# Patient Record
Sex: FEMALE | Race: WHITE | NOT HISPANIC OR LATINO | URBAN - METROPOLITAN AREA
[De-identification: names, ages, dates, MRNs, and addresses within clinical notes are randomized per-mention and may not be internally consistent; named-entity substitution may affect disease eponyms.]

---

## 2018-07-10 ENCOUNTER — OFFICE VISIT (OUTPATIENT)
Dept: URGENT CARE | Facility: CLINIC | Age: 28
End: 2018-07-10
Payer: COMMERCIAL

## 2018-07-10 VITALS
TEMPERATURE: 99.1 F | BODY MASS INDEX: 21.67 KG/M2 | SYSTOLIC BLOOD PRESSURE: 110 MMHG | DIASTOLIC BLOOD PRESSURE: 70 MMHG | HEART RATE: 68 BPM | HEIGHT: 68 IN | WEIGHT: 143 LBS | RESPIRATION RATE: 12 BRPM

## 2018-07-10 DIAGNOSIS — R19.7 DIARRHEA, UNSPECIFIED TYPE: Primary | ICD-10-CM

## 2018-07-10 PROCEDURE — 99283 EMERGENCY DEPT VISIT LOW MDM: CPT | Performed by: PHYSICIAN ASSISTANT

## 2018-07-10 PROCEDURE — G0382 LEV 3 HOSP TYPE B ED VISIT: HCPCS | Performed by: PHYSICIAN ASSISTANT

## 2018-07-10 NOTE — PROGRESS NOTES
NAME: Calista Rankin is a 32 y o  female  : 1990    MRN: [id number]      Assessment and Plan   Diarrhea, unspecified type [R19 7]  1  Diarrhea, unspecified type             Patient Instructions   Patient Instructions   F/u with PCP as needed      Proceed to ER if symptoms worsen  History of Present Illness     Patient presents complaining of recent hx of diarrhea  She states that she started on Sat night with abdominal cramping and several episodes of diarrhea  She states she had accompanied chills but no fever  She denies blood in stool  She states her sx resolved a day later but she is here because she needs a note that she is well enough to go to work  Patient denies any current complaints including abdominal or back pain, fever, chills, blood in stool, N/V or diarrhea  Review of Systems   Review of Systems   Constitutional: Negative for chills and fever  Gastrointestinal: Negative for abdominal distention, abdominal pain, blood in stool, diarrhea, nausea and vomiting  Current Medications     No current outpatient prescriptions on file  Current Allergies     Allergies as of 07/10/2018 - Reviewed 07/10/2018   Allergen Reaction Noted    Sudafed [pseudoephedrine] Palpitations 07/10/2018              Past Medical History:   Diagnosis Date    Tetralogy of Fallot        Past Surgical History:   Procedure Laterality Date    CARDIAC SURGERY         No family history on file  Medications have been verified  The following portions of the patient's history were reviewed and updated as appropriate: allergies, current medications, past family history, past medical history, past social history, past surgical history and problem list     Objective   /70   Pulse 68   Temp 99 1 °F (37 3 °C)   Resp 12   Ht 5' 8" (1 727 m)   Wt 64 9 kg (143 lb)   BMI 21 74 kg/m²      Physical Exam     Physical Exam   Constitutional: She appears well-developed and well-nourished   No distress  Abdominal: Soft  Bowel sounds are normal  She exhibits no distension  There is no tenderness  There is no rebound and no guarding

## 2018-07-10 NOTE — LETTER
July 10, 2018     Patient: Lamont Grove   YOB: 1990   Date of Visit: 7/10/2018       To Whom it May Concern:    Lamont Grove was seen in my clinic on 7/10/2018  She may return to work on 7/10/2018  If you have any questions or concerns, please don't hesitate to call           Sincerely,          Justin Barajas PA-C        CC: No Recipients

## 2025-01-28 ENCOUNTER — OFFICE VISIT (OUTPATIENT)
Dept: OBGYN CLINIC | Facility: CLINIC | Age: 35
End: 2025-01-28
Payer: COMMERCIAL

## 2025-01-28 VITALS
BODY MASS INDEX: 24.74 KG/M2 | DIASTOLIC BLOOD PRESSURE: 68 MMHG | SYSTOLIC BLOOD PRESSURE: 106 MMHG | WEIGHT: 157.6 LBS | HEIGHT: 67 IN

## 2025-01-28 DIAGNOSIS — Z01.419 ENCOUNTER FOR GYNECOLOGICAL EXAMINATION WITHOUT ABNORMAL FINDING: ICD-10-CM

## 2025-01-28 DIAGNOSIS — N92.0 MENORRHAGIA WITH REGULAR CYCLE: Primary | ICD-10-CM

## 2025-01-28 DIAGNOSIS — N89.8 VAGINAL DISCHARGE: ICD-10-CM

## 2025-01-28 PROCEDURE — 99385 PREV VISIT NEW AGE 18-39: CPT | Performed by: NURSE PRACTITIONER

## 2025-01-28 NOTE — PROGRESS NOTES
[facility] [place]  [address] [place] [address]    ASSESSMENT/PLAN: Kinga Moeller is a 34 y.o.  who presents for annual gynecologic exam.    Encounter for routine gynecologic examination  - Routine well gynecologic exam completed today.  - Cervical Cancer Screening: Current ASCCP Guidelines reviewed. Last Pap: Not on file . History of abnormal: No  - HPV Vaccination status: Not immunized  - STI screening offered including HIV testing: offered, pt declined  - Contraceptive counseling discussed.  Current contraception: vasectomy:   - The following were reviewed in today's visit: breast self exam, exercise, healthy diet, and vaginitis    Additional problems addressed during this visit:  Assessment & Plan  Menorrhagia with regular cycle  Discussed treatment with progesterone IUD, uterine ablation. Interested in uterine ablation.   Orders:    US pelvis complete non OB; Future    TSH + Free T4; Future    CBC and differential; Future    Encounter for gynecological examination without abnormal finding    Orders:    IGP, Aptima HPV, Rfx 16/18,45    Vaginal discharge  History of frequent BV, self manages with probiotic and supplements.     Orders:    VAGINOSIS DNA PROBE    CC:  Annual Gynecologic Examination    HPI: Kinga Moeller is a 34 y.o.  who presents for annual gynecologic examination. Pt concerned with heavy, painful menses since last baby 3 years ago. Reports 21 day cycles, with heavy bleeding for the first 2 days, lasting a total of 4 days. Interested in treatment. Also c/o frequent BV, which she manages with probiotic and vitamin c/rosehips supplement. Feels like symptoms starting today.     ROS: Negative except as noted in HPI    Patient's last menstrual period was 2025 (exact date).       She  reports being sexually active and has had partner(s) who are male. She reports using the following method of birth control/protection: Male Sterilization.       The following portions of the  "patient's history were reviewed and updated as appropriate:   Past Medical History:   Diagnosis Date    Congenital heart defect     Tetralogy of Fallot      Past Surgical History:   Procedure Laterality Date    CARDIAC SURGERY  1989     Family History   Problem Relation Age of Onset    Breast cancer Mother     Lung cancer Mother     Cancer Maternal Grandmother     Breast cancer Paternal Grandmother      Social History     Socioeconomic History    Marital status: /Civil Union     Spouse name: None    Number of children: None    Years of education: None    Highest education level: None   Occupational History    None   Tobacco Use    Smoking status: Never    Smokeless tobacco: Never   Vaping Use    Vaping status: Never Used   Substance and Sexual Activity    Alcohol use: Yes     Comment: socially    Drug use: Never    Sexual activity: Yes     Partners: Male     Birth control/protection: Male Sterilization   Other Topics Concern    None   Social History Narrative    None     Social Drivers of Health     Financial Resource Strain: Not on file   Food Insecurity: Not on file   Transportation Needs: Not on file   Physical Activity: Not on file   Stress: Not on file   Social Connections: Not on file   Intimate Partner Violence: Not on file   Housing Stability: Not on file     Outpatient Medications Marked as Taking for the 1/28/25 encounter (Office Visit) with DELMY Borrego   Medication    Ferrous Sulfate (IRON PO)     Allergies   Allergen Reactions    Sudafed [Pseudoephedrine] Palpitations           Objective:  /68 (BP Location: Left arm, Patient Position: Sitting, Cuff Size: Standard)   Ht 5' 7\" (1.702 m)   Wt 71.5 kg (157 lb 9.6 oz)   LMP 01/16/2025 (Exact Date)   Breastfeeding No   BMI 24.68 kg/m²        Chaperone present? Declines.    General Appearance: alert and oriented, in no acute distress.   Neck/Thyroid: No thyromegaly, no thyroid nodules.  Respiratory: Unlabored " breathing.  Cardiovascular: No peripheral edema.  Abdomen: Soft, non-tender, non-distended, no masses, no rebound or guarding.  Breast Exam: No dimpling, nipple retraction or discharge. No lumps or masses. No axillary or supraclavicular nodes.   Pelvic:       External genitalia: Normal appearance, no abnormal pigmentation, no lesions or masses. Normal Bartholin's and [place]'s.      Urinary system: Urethral meatus normal, bladder non-tender.      Vaginal: normal mucosa without prolapse or lesions. Normal-appearing physiologic discharge.      Cervix: Normal-appearing, well-epithelialized, no gross lesions or masses. No cervical motion tenderness.      Adnexa: No adnexal masses or tenderness noted.      Uterus: Normal-sized, regular contour, midline, mobile, no uterine tenderness.  Extremities: Normal range of motion. Warm, well-perfused, non-tender.   Skin: normal, no rash or abnormalities  Neurologic: alert, oriented x3  Psychiatric: Appropriate affect, mood stable, cooperative with exam.        DELMY Borrego  1/28/2025 10:58 AM

## 2025-01-28 NOTE — PATIENT INSTRUCTIONS
Please schedule your pelvic ultrasound and go to the lab for your blood work  I will reach out when results are available  Call with any concerns

## 2025-01-30 ENCOUNTER — RESULTS FOLLOW-UP (OUTPATIENT)
Dept: OBGYN CLINIC | Facility: CLINIC | Age: 35
End: 2025-01-30

## 2025-01-30 DIAGNOSIS — N76.0 BV (BACTERIAL VAGINOSIS): Primary | ICD-10-CM

## 2025-01-30 DIAGNOSIS — B96.89 BV (BACTERIAL VAGINOSIS): Primary | ICD-10-CM

## 2025-01-30 LAB
A VAGINAE DNA VAG QL NAA+PROBE: ABNORMAL SCORE
BVAB2 DNA VAG QL NAA+PROBE: ABNORMAL SCORE
C ALBICANS DNA VAG QL NAA+PROBE: NEGATIVE
C GLABRATA DNA VAG QL NAA+PROBE: NEGATIVE
C TRACH RRNA SPEC QL NAA+PROBE: NEGATIVE
MEGA1 DNA VAG QL NAA+PROBE: ABNORMAL SCORE
N GONORRHOEA RRNA SPEC QL NAA+PROBE: NEGATIVE
T VAGINALIS RRNA SPEC QL NAA+PROBE: NEGATIVE

## 2025-01-30 RX ORDER — METRONIDAZOLE 7.5 MG/G
1 GEL VAGINAL
Qty: 70 G | Refills: 3 | Status: SHIPPED | OUTPATIENT
Start: 2025-01-30

## 2025-01-30 RX ORDER — METRONIDAZOLE 500 MG/1
500 TABLET ORAL EVERY 12 HOURS SCHEDULED
Qty: 14 TABLET | Refills: 0 | Status: SHIPPED | OUTPATIENT
Start: 2025-01-30 | End: 2025-02-06

## 2025-01-30 NOTE — TELEPHONE ENCOUNTER
Reviewed + BV result with pt, discussed treatment followed by suppression with vaginal gel for recurrent BV. Pt would like to try. Rx to pharmacy. Follow up visit prn.

## 2025-02-03 LAB
CYTOLOGIST CVX/VAG CYTO: NORMAL
DX ICD CODE: NORMAL
HPV GENOTYPE REFLEX: NORMAL
HPV I/H RISK 4 DNA CVX QL PROBE+SIG AMP: NEGATIVE
OTHER STN SPEC: NORMAL
PATH REPORT.FINAL DX SPEC: NORMAL
SL AMB NOTE:: NORMAL
SL AMB SPECIMEN ADEQUACY: NORMAL
SL AMB TEST METHODOLOGY: NORMAL

## 2025-02-07 ENCOUNTER — HOSPITAL ENCOUNTER (OUTPATIENT)
Dept: ULTRASOUND IMAGING | Facility: HOSPITAL | Age: 35
End: 2025-02-07
Payer: COMMERCIAL

## 2025-02-07 DIAGNOSIS — N92.0 MENORRHAGIA WITH REGULAR CYCLE: ICD-10-CM

## 2025-02-07 PROCEDURE — 76830 TRANSVAGINAL US NON-OB: CPT

## 2025-02-07 PROCEDURE — 76856 US EXAM PELVIC COMPLETE: CPT

## 2025-02-13 ENCOUNTER — RESULTS FOLLOW-UP (OUTPATIENT)
Dept: OBGYN CLINIC | Facility: CLINIC | Age: 35
End: 2025-02-13

## 2025-02-13 ENCOUNTER — TELEPHONE (OUTPATIENT)
Dept: OBGYN CLINIC | Facility: CLINIC | Age: 35
End: 2025-02-13

## 2025-02-13 NOTE — TELEPHONE ENCOUNTER
Spoke with pt, reviewed normal ultrasound results. Recommend Mirena for control of heavy menses. Pt interested and will call back to schedule.

## 2025-02-25 LAB
BASOPHILS # BLD AUTO: 0 X10E3/UL (ref 0–0.2)
BASOPHILS NFR BLD AUTO: 1 %
EOSINOPHIL # BLD AUTO: 0.3 X10E3/UL (ref 0–0.4)
EOSINOPHIL NFR BLD AUTO: 5 %
ERYTHROCYTE [DISTWIDTH] IN BLOOD BY AUTOMATED COUNT: 11.4 % (ref 11.7–15.4)
HCT VFR BLD AUTO: 37.6 % (ref 34–46.6)
HGB BLD-MCNC: 12.8 G/DL (ref 11.1–15.9)
IMM GRANULOCYTES # BLD: 0 X10E3/UL (ref 0–0.1)
IMM GRANULOCYTES NFR BLD: 0 %
LYMPHOCYTES # BLD AUTO: 1.7 X10E3/UL (ref 0.7–3.1)
LYMPHOCYTES NFR BLD AUTO: 30 %
MCH RBC QN AUTO: 30.1 PG (ref 26.6–33)
MCHC RBC AUTO-ENTMCNC: 34 G/DL (ref 31.5–35.7)
MCV RBC AUTO: 89 FL (ref 79–97)
MONOCYTES # BLD AUTO: 0.4 X10E3/UL (ref 0.1–0.9)
MONOCYTES NFR BLD AUTO: 6 %
NEUTROPHILS # BLD AUTO: 3.3 X10E3/UL (ref 1.4–7)
NEUTROPHILS NFR BLD AUTO: 58 %
PLATELET # BLD AUTO: 224 X10E3/UL (ref 150–450)
RBC # BLD AUTO: 4.25 X10E6/UL (ref 3.77–5.28)
T4 FREE SERPL DIALY-MCNC: 0.96 NG/DL
TSH SERPL-ACNC: 1.5 UU/ML
WBC # BLD AUTO: 5.7 X10E3/UL (ref 3.4–10.8)